# Patient Record
Sex: MALE | Race: WHITE | Employment: STUDENT | URBAN - METROPOLITAN AREA
[De-identification: names, ages, dates, MRNs, and addresses within clinical notes are randomized per-mention and may not be internally consistent; named-entity substitution may affect disease eponyms.]

---
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
## 2023-12-15 ENCOUNTER — OFFICE VISIT (OUTPATIENT)
Dept: PRIMARY CARE | Facility: CLINIC | Age: 23
End: 2023-12-15
Payer: COMMERCIAL

## 2023-12-15 VITALS
HEART RATE: 103 BPM | BODY MASS INDEX: 28.01 KG/M2 | SYSTOLIC BLOOD PRESSURE: 120 MMHG | HEIGHT: 76 IN | WEIGHT: 230 LBS | DIASTOLIC BLOOD PRESSURE: 70 MMHG | RESPIRATION RATE: 16 BRPM | TEMPERATURE: 98.7 F | OXYGEN SATURATION: 95 %

## 2023-12-15 DIAGNOSIS — J02.9 SORE THROAT: ICD-10-CM

## 2023-12-15 DIAGNOSIS — J03.90 ACUTE TONSILLITIS, UNSPECIFIED ETIOLOGY: ICD-10-CM

## 2023-12-15 DIAGNOSIS — J02.9 ACUTE PHARYNGITIS, UNSPECIFIED ETIOLOGY: ICD-10-CM

## 2023-12-15 LAB — POC RAPID STREP: NEGATIVE

## 2023-12-15 PROCEDURE — 87880 STREP A ASSAY W/OPTIC: CPT | Performed by: FAMILY MEDICINE

## 2023-12-15 PROCEDURE — 1036F TOBACCO NON-USER: CPT | Performed by: FAMILY MEDICINE

## 2023-12-15 PROCEDURE — 99214 OFFICE O/P EST MOD 30 MIN: CPT | Performed by: FAMILY MEDICINE

## 2023-12-15 RX ORDER — METHYLPREDNISOLONE 4 MG/1
TABLET ORAL
Qty: 21 TABLET | Refills: 0 | Status: SHIPPED | OUTPATIENT
Start: 2023-12-15 | End: 2023-12-22

## 2023-12-15 RX ORDER — AZITHROMYCIN 250 MG/1
TABLET, FILM COATED ORAL
Qty: 6 TABLET | Refills: 0 | Status: SHIPPED | OUTPATIENT
Start: 2023-12-15 | End: 2023-12-19

## 2023-12-15 NOTE — PROGRESS NOTES
"Subjective   Patient ID: Yassine Herrera is a 23 y.o. male who presents for Sore Throat.    HPI    Pt is being seen for sore throat and white patches  Ongoing for 3 day    Other symptoms include drainage, sinus pressure, very congested  Pt states discovered the white patch on right side of throat  Pt is taking Cold and Flu medicine some relief    No other concern or question      Review of systems  ; Patient seen today for exam denies any problems with headaches or vision, denies any shortness of breath chest pain nausea or vomiting, no black stool no blood in the stool no heartburn type symptoms denies any problems with constipation or diarrhea, and no dysuria-type symptoms    The patient's allergies medications were reviewed with them today    The patient's social family and surgical history or also reviewed here today, along with her past medical history.     Objective     Alert and active in  no acute distress  HEENT TMs clear oropharynx negative nares clear pos  drainage noted neck supple//positive exudate right tonsillar crypt more than left  With no adenopathy   Heart regular rate and rhythm without murmur and no carotid bruits  Lungs- clear to auscultation bilaterally, no wheeze or rhonchi noted  Thyroid -negative masses or nodularity  Abdomen- soft times four quadrants , bowel sounds positive no masses or organomegaly, negative tenderness guarding or rebound  Neurological exam unremarkable- DTRs in upper and lower extremities within normal limits.   skin -no lesions noted      /70 (BP Location: Right arm, Patient Position: Sitting, BP Cuff Size: Large adult)   Pulse 103   Temp 37.1 °C (98.7 °F) (Temporal)   Resp 16   Ht 1.918 m (6' 3.5\")   Wt 104 kg (230 lb)   SpO2 95%   BMI 28.37 kg/m²     No Known Allergies    Assessment/Plan   Problem List Items Addressed This Visit    None  Visit Diagnoses       Sore throat        Relevant Orders    POCT Rapid Strep A manually resulted (Completed)    Acute " pharyngitis, unspecified etiology        Relevant Medications    azithromycin (Zithromax) 250 mg tablet    methylPREDNISolone (Medrol Dospak) 4 mg tablets    Other Relevant Orders    Adis-Barr Virus Antibody Panel    Acute tonsillitis, unspecified etiology        Relevant Medications    azithromycin (Zithromax) 250 mg tablet    methylPREDNISolone (Medrol Dospak) 4 mg tablets    Other Relevant Orders    Adis-Barr Virus Antibody Panel          Rapid strep performed, NEG.    Z-lópez and Medrol dose pack prescribed.    Labs have been ordered, she/he will have these performed and we will contact her/him with results.  (Adis Barr Antibodies)  Have these performed in a couple of days.    If anything worsens or changes please call us at once, follow up in the office as planned.    Scribe Attestation  By signing my name below, I, Shiela Bernstein MA, Scribe   attest that this documentation has been prepared under the direction and in the presence of Johann Moody DO.       This was an emergent procedure. Benefits, risks, and possible complications of procedure explained to patient/caregiver who verbalized understanding and gave verbal consent.

## 2024-05-28 ENCOUNTER — OFFICE VISIT (OUTPATIENT)
Dept: PRIMARY CARE | Facility: CLINIC | Age: 24
End: 2024-05-28
Payer: COMMERCIAL

## 2024-05-28 VITALS
BODY MASS INDEX: 28.76 KG/M2 | RESPIRATION RATE: 16 BRPM | DIASTOLIC BLOOD PRESSURE: 76 MMHG | SYSTOLIC BLOOD PRESSURE: 124 MMHG | HEART RATE: 50 BPM | OXYGEN SATURATION: 97 % | TEMPERATURE: 97.8 F | WEIGHT: 236.2 LBS | HEIGHT: 76 IN

## 2024-05-28 DIAGNOSIS — Z00.00 ROUTINE GENERAL MEDICAL EXAMINATION AT A HEALTH CARE FACILITY: ICD-10-CM

## 2024-05-28 DIAGNOSIS — H57.12 EYE PAIN, LEFT: ICD-10-CM

## 2024-05-28 DIAGNOSIS — H10.33 ACUTE BACTERIAL CONJUNCTIVITIS OF BOTH EYES: Primary | ICD-10-CM

## 2024-05-28 DIAGNOSIS — Z13.220 LIPID SCREENING: ICD-10-CM

## 2024-05-28 PROCEDURE — 1036F TOBACCO NON-USER: CPT | Performed by: FAMILY MEDICINE

## 2024-05-28 PROCEDURE — 99213 OFFICE O/P EST LOW 20 MIN: CPT | Performed by: FAMILY MEDICINE

## 2024-05-28 PROCEDURE — 3008F BODY MASS INDEX DOCD: CPT | Performed by: FAMILY MEDICINE

## 2024-05-28 RX ORDER — TOBRAMYCIN AND DEXAMETHASONE 3; 1 MG/ML; MG/ML
1 SUSPENSION/ DROPS OPHTHALMIC
Qty: 5 ML | Refills: 0 | Status: SHIPPED | OUTPATIENT
Start: 2024-05-28 | End: 2024-06-04

## 2024-05-28 ASSESSMENT — PATIENT HEALTH QUESTIONNAIRE - PHQ9
2. FEELING DOWN, DEPRESSED OR HOPELESS: NOT AT ALL
1. LITTLE INTEREST OR PLEASURE IN DOING THINGS: NOT AT ALL
SUM OF ALL RESPONSES TO PHQ9 QUESTIONS 1 AND 2: 0

## 2024-05-28 NOTE — PROGRESS NOTES
"Subjective   Patient ID: Yassine Herrera is a 24 y.o. male who presents for Eye Pain.  HPI  pt is being seen for left  Eye pain   ongoing for 1 week ago  other symptoms includes dryness, irritation , redness in both eye   Has tried benadryl, and Vaseline minimal relief       No other concern /question   Review of systems  ; Patient seen today for exam denies any problems with headaches or vision, denies any shortness of breath chest pain nausea or vomiting, no black stool no blood in the stool no heartburn type symptoms denies any problems with constipation or diarrhea, and no dysuria-type symptoms    The patient's allergies medications were reviewed with them today    The patient's social family and surgical history or also reviewed here today, along with her past medical history.     Objective     Alert and active in  no acute distress  HEENT TMs clear oropharynx negative nares clear no drainage noted neck supple  With no adenopathy//bilateral eyes show some exudate erythema on the eyelids consistent with probable irritation from allergies vase contact irritation no signs of photophobia   Heart regular rate and rhythm without murmur and no carotid bruits  Lungs- clear to auscultation bilaterally, no wheeze or rhonchi noted  Thyroid -negative masses or nodularity  Abdomen- soft times four quadrants , bowel sounds positive no masses or organomegaly, negative tenderness guarding or rebound    skin -no lesions noted      /76 (BP Location: Right arm, Patient Position: Sitting, BP Cuff Size: Large adult)   Pulse 50   Temp 36.6 °C (97.8 °F) (Temporal)   Resp 16   Ht 1.918 m (6' 3.5\")   Wt 107 kg (236 lb 3.2 oz)   SpO2 97%   BMI 29.13 kg/m²     No Known Allergies    Assessment/Plan   Problem List Items Addressed This Visit       BMI 29.0-29.9,adult    Eye pain, left    Routine general medical examination at a health care facility    Relevant Orders    CBC and Auto Differential    Comprehensive Metabolic Panel "     Other Visit Diagnoses       Acute bacterial conjunctivitis of both eyes    -  Primary    Relevant Medications    tobramycin-dexamethasone (Tobradex) ophthalmic suspension    Lipid screening        Relevant Orders    Lipid Panel        Will stop wearing contacts for a week let me know later in the week if things are not better understands portance close follow-up with us      If anything worsens or changes please call us at once, follow up in the office as planned,

## 2024-12-10 NOTE — PROGRESS NOTES
Mr Herrera's mother accompanied him to today's appointment    History of Present Illness:  Yassine Herrera is a 24 y.o. male with a family history of cancer.  Yassine Herrera was self-referred to the Cancer Genetics Clinic at [facility]. Yassine Herrera is interested in genetic testing to clarify their personal risk for cancer, as well as the risks to their family members.    Cancer Medical History:  Personal history of cancer? No     Prior genetic testing? No     Cancer screening history:  Colonoscopy? No  Endoscopy? No   Dermatology? No   Prostate? No  Other cancer screening? None        Family history:  A 4-generation pedigree was obtained and was significant for the following:   Mother (living, 48) with an identified MSH2 c.2038C>T (p.[id number]) mutation per [employer] in 2018. She has a h/o mucinous ovarian cancer (stage III) diagnosed at 42  Maternal grandfather (living, 69) with a h/o colon cancer at 45. His brother has a h/o colon cancer at 48 and is Hdz Syndrome positive.  Paternal uncle (living, 56) with a h/o leukemia at 11  Maternal ancestry is English, Ukrainian, and Romanian. Paternal ancestry is English. There is no known Ashkenazi Mandaen ancestry. Consanguinity was denied.       Discussion:  Yassine Herrera is a 24 y.o. old male with personal/family history of colon cancer.  Based on his family history, Yassine Herrera meets NCCN criteria for testing of the MSH2 gene. Our discussion is summarized below.    We reviewed genes and chromosomes and inherited forms of colon cancer, specifically MSH2. We discussed that most cancers are not due to an inherited genetic susceptibility. However, in about 5-10% of families, there is an inherited genetic mutation that can make a person more susceptible to developing certain forms of cancer. Within these families, we often see multiple family members with cancer, occurring in multiple generations. In addition, earlier onset cancers are suggestive of an inherited  form of cancer. Finally, there is a clustering of certain types of cancer in these families.    We discussed Hdz syndrome which is caused by germline mutations in one of five genes - MLH1, MSH2, MSH6, PMS2, and EPCAM. Individuals with Hdz syndrome have increased risk to develop colon cancer over their lifetime, and an increased risk for a second colon primary. Women with Hdz syndrome have an increased risk for endometrial cancer and ovarian cancer. Other cancers associated with Hdz syndrome include gastric cancer, hepatobiliary, small bowel, urinary tract, and rarely pancreatic cancer. Understanding if an individual has this condition results in changes to their medical management such as more frequent colonoscopies. For MSH2 specifically, individuals have the following risks and recommendations:    GI Cancers  -Individuals with MSH2 mutations have a 33-52% chance to develop colon cancer by age 80. This is elevated over the general population risk of 4-6%. NCCN states that for MSH2 mutation carriers who have had an adenocarcinoma, a segmental or extended colectomy is indicated.     -There is also an increased risk for individuals with MSH2 mutations to develop stomach cancer (0.2-9.0%) and small bowel cancer (1-10%). There is no clear data currently that supports screenings for these cancers, but cancer screening can be considered based on family history, other risk factors, etc. If screening is pursued, upper endoscopy exams should begin at age 40 and be repeated every 3-5 years.     Other Cancer Risks  -Men with MSH2 mutations have an increased risk to develop prostate cancer (4-16%), and should undergo annual PSA screenings. There is not sufficient evidence to suggest starting PSA screenings at an earlier age than normal. Men and women with MSH2 mutations also have an increased risk to develop CNS cancers, so an annual physical and neurological exam beginning between ages 25-30 can be  considered.    -Individuals with MSH2 mutations have a slight increased risk to develop pancreatic cancer but no additional surveillance is recommended at this time, in the absence of a family history of these cancers. Mr. Herrera does not have any known family history of pancreatic cancer.     -Individuals with MSH2 mutations, especially men, can consider having a urinalysis every year to screen for other cancers.     Hdz syndrome genes, such as MSH2, are inherited in an autosomal dominant fashion. This means that if an individual has a change in one of these genes, their siblings and children have a 50% chance of also having that gene change and a 50% chance of not having the gene change. As such, Mr. Herrera has a 50% chance to have the same mutation as his mother.    We also briefly discussed that MSH2 is associated with a rare, separate condition when an individual inherits a mutation in BOTH of their MSH2 copies (autosomal recessive inheritance). This condition is caused constitutional mismatch repair deficiency (CMRRD) syndrome. Characteristics of CMRRD include risks for cancer at a young age (such as blood, brain, or GI cancers) as well as distinct skin findings (like flat brown birth marks called cafe au laits or patches of lighter skin). These symptoms are typically seen in infancy or young adulthood. There are options for prenatal or preconception counseling for Yassine Herrera's future children (or other family members who would wish to expand their families) should like to explore the risk to have a child with CMRRD further.    We discussed that there are multiple genes associated with increased cancer risk. Some genes are considered highly penetrant cancer genes, meaning a mutation in the gene confers a high risk of cancer. Additionally, there are other intermediate (moderate risk) cancer genes. For some of the moderate risk genes, there is often limited information regarding the degree to which a  mutation in the gene affects risk of different types of cancers. Additionally, for some of these moderate risk genes, the appropriate management for individuals who have a mutation in one of these genes is not always clear. Our knowledge about the cancer risks associated with mutations in these moderate risk genes is always growing, and we will likely be able to provide more comprehensive information in the future.    If Mr. Herrera elects to do a multi-gene panel, we reviewed the three results we can get back:  1. Positive- Identified a change in a cancer gene that confers an increased cancer risk. We will discuss potential changes in management for him and his family based on the specific gene mutation found.  2. Negative- Clears him for a majority of predisposition cancer syndromes that we are aware of, but cannot clear her for any/all cancer predisposition risks. She would continue to be screened based on her personal and family history.  3. Variant of Uncertain Significance (VUS)- We discussed should an uncertain result come back that this would be treated like a negative result (i.e. no management recommendation will be made no familial variant testing) as the implications of this finding are currently unknown.    Lastly, we discussed the Genetic Information Non-discrimination Act (NIKKO) of 2008. We discussed that per this federal law, employers (at companies with 15 employees or greater) and health insurance companies (barring  and other  insurances) are forbidden to ask for and use genetic information against another person. As such, health insurance companies cannot ask for genetic information and use findings affect coverage or rates. However, luxury insurances such as life insurance, long term care insurance, and/or private disability insurance companies are not forbidden against using genetic information when an individual takes out a new/additional policy in one of those areas. As such,  for unaffected individuals it could be beneficial to explore/take out policies in luxury insurance areas PRIOR to undergoing genetic testing.    Yassine Herrera was counseled about hereditary cancer susceptibility including cancer risks, options for increased screening and/or risk reduction, genetic testing, and the implications for other family members. Yassine Herrera elected to move forward with genetic testing via a single site testing for only the familial mutation with was ordered through [facility].      Results are typically available within 2-3 weeks, and Yassine Herrera will return to the Cancer Genetics Clinic to discuss his testing results. At that time, we will make recommendations for both Yassine Herrera and his family members in terms of cancer screening and/or cancer risk reduction options.         PLAN:  1.  Yassine Herrera elected to undergo genetic testing via single site testing of the familial variant through [facility]. Blood was drawn and sent to [facility].    2. Yassine Herrera will return to the genetics clinic via telephone in approximately 2-3 weeks to discuss his test results.    3. We remain available to Yassine Herrera or his family members at [phone] if any questions arise regarding information discussed at today's visit.    Luna Garcia MS, [facility] [place], [place]  Certified Genetic Counselor  [facility]  [phone]    Time spent: 40 minutes

## 2024-12-11 ENCOUNTER — TELEPHONE (OUTPATIENT)
Dept: GENETICS | Facility: CLINIC | Age: 24
End: 2024-12-11
Payer: COMMERCIAL

## 2024-12-11 NOTE — TELEPHONE ENCOUNTER
Patient's mother, Julieta Herrera, called regarding her son's upcoming appointment with [facility]. Patient's mother confirmed that she had genetic testing and gave verbal consent for the genetic counselor to look at her  chart and to share the results of her genetic testing with the patient.

## 2024-12-11 NOTE — TELEPHONE ENCOUNTER
Called patient regarding his upcoming appointment with RALEIGH Yung. Left a message and requested that he call me back at my direct line.

## 2024-12-20 ENCOUNTER — LAB (OUTPATIENT)
Dept: LAB | Facility: CLINIC | Age: 24
End: 2024-12-20
Payer: COMMERCIAL

## 2024-12-20 ENCOUNTER — CLINICAL SUPPORT (OUTPATIENT)
Dept: GENETICS | Facility: CLINIC | Age: 24
End: 2024-12-20
Payer: COMMERCIAL

## 2024-12-20 VITALS
SYSTOLIC BLOOD PRESSURE: 151 MMHG | RESPIRATION RATE: 16 BRPM | WEIGHT: 234.35 LBS | BODY MASS INDEX: 28.54 KG/M2 | HEART RATE: 65 BPM | HEIGHT: 76 IN | OXYGEN SATURATION: 97 % | TEMPERATURE: 98.2 F | DIASTOLIC BLOOD PRESSURE: 79 MMHG

## 2024-12-20 DIAGNOSIS — Z71.83 ENCOUNTER FOR NONPROCREATIVE GENETIC COUNSELING: ICD-10-CM

## 2024-12-20 DIAGNOSIS — Z84.81 FAMILY HISTORY OF GENETIC DISEASE CARRIER: ICD-10-CM

## 2024-12-20 PROCEDURE — 96040 PR MEDICAL GENETICS COUNSELING EACH 30 MINUTES: CPT

## 2024-12-20 PROCEDURE — 96040 HC GENETIC COUNSELING, EACH 30 MIN: CPT

## 2024-12-20 PROCEDURE — 36415 COLL VENOUS BLD VENIPUNCTURE: CPT

## 2024-12-20 ASSESSMENT — PAIN SCALES - GENERAL: PAINLEVEL_OUTOF10: 0-NO PAIN

## 2025-01-10 ENCOUNTER — TELEPHONE (OUTPATIENT)
Dept: GENETICS | Facility: CLINIC | Age: 25
End: 2025-01-10
Payer: COMMERCIAL

## 2025-01-13 LAB
COMMENTS - MP RESULT TYPE: NORMAL
SCAN RESULT: NORMAL

## 2025-01-14 NOTE — PROGRESS NOTES
Mr Herrera was accompanied by his mother at his appointment today.    - You will received a copy of your genetic testing results with a family letter at the time of the appointment.  - Below is a summary of what we discussed at your appointment.    - Mr Herrera is a 24 year old male with a family history of MSH2 mutation and ovarian cancer in his mother.  - Mr Herrera was initially seen in the Cancer Genetics Clinic on 12/20/24 for counseling and coordination of testing.  - Following that appointment, a blood sample was sent for genetic testing via the single site analysis from [facility].  - I called him to discuss his results. Our discussion is summarized below.    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 Mutations in the MSH2 gene are associated with a condition called Hdz syndrome.       GI Cancers  -Individuals with MSH2 mutations have a 33-52% chance to develop colon cancer by age 80. A meta-analysis has reported cumulative risk for CRC for MSH2 carriers through age 70 for males to be 53.9% and for females to be 38.6% (Augusto BALLESTEROS, et al. JNCI Cancer Spectr 2020; 4:ctck221). This is elevated over the general population risk of 4%. NCCN states that for MSH2 mutation carriers who have had an adenocarcinoma, a segmental or extended colectomy is indicated. Currently, high-quality colonoscopy at age 20-25 years or 2-5 years prior to the earliest CRC if it is diagnosed before age 25 years and repeating every 1-2 years is recommended. Additionally, individuals can consider using daily aspirin to reduce their future risk of CRC (In a large, prospective, placebo-controlled, multinational CAPP2 study of individuals with MLH1-, MSH2-, and MSH6-associated LS, daily aspirin 600 mg/day for at least 2 years was found to significantly decrease the likelihood of incident CRC (per-protocol HR, 0.56; 95% CI, 0.34-0.91;  intention-to-treat HR, 0.65; 95% CI, 0.43-0.97) with no significant increased likelihood of adverse events (Justus J, et al. Lancet 2020;395:5790-8041). The decision to use aspirin for reduction of CRC risk in LS and the dose chosen should be made on an individual basis, including discussion of individual risks, benefits, adverse effects, and childbearing plans.    -There is also an increased risk for individuals with MSH2 mutations to develop stomach cancer (0.2-9.0%) and small bowel cancer (1-10%). Upper GI surveillance with EGD starting at age 30-40 years and repeat every 2-4 years, preferably performed in conjunction with colonoscopy is currentlyu recommended (Wyatt S, et al. Int J Cancer 2021;148:106-114; Shila FU et al. Gastrointest Endosc 2022;95:105-114; Con S, et al. Can Prev Res [Phila] 2020; 13:8014-8633). A referral to gastroenterology was offered, however Mr Herrera would like to establish with his mother provider at [facility] if possible.    Other Cancer Risks  -Men with MSH2 mutations have an increased risk to develop prostate cancer (4-24%), and should undergo annual PSA screenings beginning at the age of 40 (and to consider screening at annual intervals rather than every other year). There is not sufficient evidence to suggest starting PSA screenings at an earlier age than normal. Men and women with MSH2 mutations also have an increased risk to develop CNS cancers. Patients should be educated regarding signs and symptoms of neurologic cancer and the importance of prompt reporting of abnormal symptoms to their physicians.    -Individuals with MSH2 mutations have a slight increased risk to develop pancreatic cancer but no additional surveillance is recommended at this time, in the absence of a family history of these cancers. Mr Herrera does not have any known family history of pancreatic cancer.     -Individuals with MSH2 mutations, especially men, can consider having a urinalysis every  "year beginning at age 30-35 to screen for other cancers, such as bladder cancer (lifetime risk of 4-13% compared to the general population risk of 2%) and renal pelvis/ureter cancers (lifetime risk of 2-28%).     Implications for Family Members  - We discussed that each of Yassine Herrera's future children would have a 50% chance to inherit the same MSH2 mutation. In addition, each of his siblings would have a 50% chance to have inherited this mutation.      - We also briefly discussed that MSH2 is associated with a rare, separate condition when an individual inherits a mutation in BOTH of their MSH2 copies (autosomal recessive inheritance). This condition is caused constitutional mismatch repair deficiency (CMRRD) syndrome. Characteristics of CMRRD include risks for cancer at a young age (such as blood, brain, or GI cancers) as well as distinct skin findings (like flat brown birth marks called cafe au laits or patches of lighter skin). These symptoms are typically seen in infancy or young adulthood. There are options for prenatal or preconception counseling for Yassine Bobs children (or other family members who would wish to expand their families) should like to explore the risk to have a child with CMRRD further.    -Any relatives who need assistance finding a genetic counselor in their area can search online for \"Find a Genetic Counselor\" or visit the National Society of Genetic Counselors' page at <https://www.nsgc.org/findageneticcounselor> to find a provider. They are also more than welcome to contact me directly at [phone].      Luna Garcia MS, [facility] [place]  Certified Genetic Counselor  [facility]  [phone]    Total time spent on day of encounter: 30 minutes (20 minutes with patient, 10 minutes on pre/post patient care activities, including documentation).        "

## 2025-01-24 ENCOUNTER — CLINICAL SUPPORT (OUTPATIENT)
Dept: GENETICS | Facility: CLINIC | Age: 25
End: 2025-01-24
Payer: COMMERCIAL

## 2025-01-24 VITALS
OXYGEN SATURATION: 96 % | WEIGHT: 230.16 LBS | BODY MASS INDEX: 28.32 KG/M2 | TEMPERATURE: 99.3 F | SYSTOLIC BLOOD PRESSURE: 133 MMHG | RESPIRATION RATE: 16 BRPM | HEART RATE: 53 BPM | DIASTOLIC BLOOD PRESSURE: 70 MMHG

## 2025-01-24 DIAGNOSIS — Z15.09 MSH2 GENE MUTATION POSITIVE: ICD-10-CM

## 2025-01-24 DIAGNOSIS — Z15.01 MSH2 GENE MUTATION POSITIVE: ICD-10-CM

## 2025-01-24 DIAGNOSIS — Z71.83 ENCOUNTER FOR NONPROCREATIVE GENETIC COUNSELING: ICD-10-CM

## 2025-01-24 PROCEDURE — 96041 GENETIC COUNSELING SVC EA 30: CPT

## 2025-01-24 ASSESSMENT — PAIN SCALES - GENERAL: PAINLEVEL_OUTOF10: 0-NO PAIN

## 2025-05-07 ENCOUNTER — TELEPHONE (OUTPATIENT)
Dept: PRIMARY CARE | Facility: CLINIC | Age: 25
End: 2025-05-07
Payer: COMMERCIAL

## 2025-05-07 DIAGNOSIS — K52.9 ACUTE GASTROENTERITIS: Primary | ICD-10-CM

## 2025-06-05 ENCOUNTER — PREP FOR PROCEDURE (OUTPATIENT)
Age: 25
End: 2025-06-05

## 2025-06-05 DIAGNOSIS — Z12.11 ENCOUNTER FOR SCREENING COLONOSCOPY: ICD-10-CM

## 2025-06-09 ENCOUNTER — PREP FOR PROCEDURE (OUTPATIENT)
Dept: SURGERY | Age: 25
End: 2025-06-09

## 2025-06-09 DIAGNOSIS — Z80.0 FAMILY HISTORY OF COLON CANCER: ICD-10-CM

## 2025-06-09 RX ORDER — SODIUM CHLORIDE 9 MG/ML
INJECTION, SOLUTION INTRAVENOUS PRN
Status: CANCELLED | OUTPATIENT
Start: 2025-06-09

## 2025-06-09 RX ORDER — SODIUM CHLORIDE 0.9 % (FLUSH) 0.9 %
5-40 SYRINGE (ML) INJECTION EVERY 12 HOURS SCHEDULED
Status: CANCELLED | OUTPATIENT
Start: 2025-06-09

## 2025-06-09 RX ORDER — SODIUM CHLORIDE 0.9 % (FLUSH) 0.9 %
5-40 SYRINGE (ML) INJECTION PRN
Status: CANCELLED | OUTPATIENT
Start: 2025-06-09

## 2025-06-12 ENCOUNTER — TELEPHONE (OUTPATIENT)
Dept: PRIMARY CARE | Facility: CLINIC | Age: 25
End: 2025-06-12

## 2025-06-12 ENCOUNTER — OFFICE VISIT (OUTPATIENT)
Dept: PRIMARY CARE | Facility: CLINIC | Age: 25
End: 2025-06-12
Payer: COMMERCIAL

## 2025-06-12 VITALS
OXYGEN SATURATION: 97 % | RESPIRATION RATE: 16 BRPM | DIASTOLIC BLOOD PRESSURE: 70 MMHG | WEIGHT: 229.6 LBS | SYSTOLIC BLOOD PRESSURE: 130 MMHG | HEIGHT: 77 IN | BODY MASS INDEX: 27.11 KG/M2 | TEMPERATURE: 98.4 F | HEART RATE: 51 BPM

## 2025-06-12 DIAGNOSIS — J03.90 ACUTE TONSILLITIS, UNSPECIFIED ETIOLOGY: ICD-10-CM

## 2025-06-12 DIAGNOSIS — J01.00 ACUTE MAXILLARY SINUSITIS, RECURRENCE NOT SPECIFIED: Primary | ICD-10-CM

## 2025-06-12 PROCEDURE — 1036F TOBACCO NON-USER: CPT | Performed by: FAMILY MEDICINE

## 2025-06-12 PROCEDURE — 99213 OFFICE O/P EST LOW 20 MIN: CPT | Performed by: FAMILY MEDICINE

## 2025-06-12 PROCEDURE — 3008F BODY MASS INDEX DOCD: CPT | Performed by: FAMILY MEDICINE

## 2025-06-12 RX ORDER — CEFDINIR 300 MG/1
600 CAPSULE ORAL DAILY
Qty: 14 CAPSULE | Refills: 0 | Status: SHIPPED | OUTPATIENT
Start: 2025-06-12 | End: 2025-06-19

## 2025-06-12 ASSESSMENT — ENCOUNTER SYMPTOMS: DEPRESSION: 0

## 2025-06-12 NOTE — TELEPHONE ENCOUNTER
Pt mom called in stating pt has been dealing with what they thought were allergies for over a week now but has progressively gotten worse. Mom wants to know if we can do a virtual appointment for him, he doesn't want to take off work but is currently working from home today and tomorrow so would be able to do virtual      Please advise  Call mom [phone]

## 2025-06-12 NOTE — TELEPHONE ENCOUNTER
Johann Moody, DO to Me  Do [id number] [employer]  (Selected Message)        6/12/25  9:38 AM  It is best if I can look at his ears, lets make an appointment for him when he takes lunch say at 1130 or 1145 can be in and out of here and 1/2-hour, if that is not possible then we could do a virtual but still better I will look at those ears, you can squeeze him in whenever they would like thanks

## 2025-06-12 NOTE — PROGRESS NOTES
"Subjective   Patient ID: Yassine Herrera is a 25 y.o. male who presents for Allergies.  HPI    Patient presents in office for allergies. Patient states that it has gotten worse over the past week. Symptoms runny nose, blowing out less green and more clear discharge, sneezing, and dry and itchy eyes. He denies significant coughing. Has tried Claritin. No relief. No one is sick at home.      Review of systems  ; Patient seen today for exam denies any problems with headaches or vision, denies any shortness of breath chest pain nausea or vomiting, no black stool no blood in the stool no heartburn type symptoms denies any problems with constipation or diarrhea, and no dysuria-type symptoms    The patient's allergies medications were reviewed with them today    The patient's social family and surgical history or also reviewed here today, along with her past medical history.     Objective     Physical examination;  vital signs are as noted, alert and oriented in no acute distress  HEENT exam TMs are showing fluid bilaterally, pharynx reveals postnasal drainage noted, positive pain over maxillary and frontal sinuses, positive shotty adenopathy noted bilateral neck, neck exam was supple without masses heart regular rate and rhythm without gallops lungs clear to auscultation with no wheezing or rhonchi bilaterally , extremities no edema skin without any changes      /70 (BP Location: Right arm, Patient Position: Sitting, BP Cuff Size: Adult)   Pulse 51   Temp 36.9 °C (98.4 °F) (Temporal)   Resp 16   Ht 1.956 m (6' 5\")   Wt 104 kg (229 lb 9.6 oz)   SpO2 97%   BMI 27.23 kg/m²         Assessment/Plan   Problem List Items Addressed This Visit    None  Visit Diagnoses         Acute maxillary sinusitis, recurrence not specified    -  Primary      Acute tonsillitis, unspecified etiology        Relevant Medications    cefdinir (Omnicef) 300 mg capsule            Prescribed Cefdinir 600 mg every day for 10 days. Take yogurt " with it.    Recommended using OTC Afrin nasal spray for 2-3 days for de-congestion.    Take OTC Sudafed Sinus.    Drink plenty of fluid, water, Gatorade, warm tea with honey.    If anything worsens or changes please call us at once, follow up in the office as planned,       Scribe Attestation  By signing my name below, INoemi Scribe   attest that this documentation has been prepared under the direction and in the presence of Johann Moody DO.    All medical record entries made by the Scribe were at my direction and personally dictated by me.   I have reviewed the chart and agree that the record accurately reflects my personal performance of the history, physical exam, discussion, and plan.

## 2025-07-03 ENCOUNTER — ANESTHESIA EVENT (OUTPATIENT)
Dept: OPERATING ROOM | Age: 25
End: 2025-07-03
Payer: COMMERCIAL

## 2025-07-03 ENCOUNTER — HOSPITAL ENCOUNTER (OUTPATIENT)
Age: 25
Setting detail: OUTPATIENT SURGERY
Discharge: HOME | End: 2025-07-03
Attending: COLON & RECTAL SURGERY | Admitting: COLON & RECTAL SURGERY
Payer: COMMERCIAL

## 2025-07-03 ENCOUNTER — ANESTHESIA (OUTPATIENT)
Dept: OPERATING ROOM | Age: 25
End: 2025-07-03
Payer: COMMERCIAL

## 2025-07-03 VITALS
RESPIRATION RATE: 16 BRPM | TEMPERATURE: 98 F | HEIGHT: 77 IN | DIASTOLIC BLOOD PRESSURE: 77 MMHG | SYSTOLIC BLOOD PRESSURE: 129 MMHG | BODY MASS INDEX: 27.16 KG/M2 | OXYGEN SATURATION: 99 % | WEIGHT: 230 LBS | HEART RATE: 78 BPM

## 2025-07-03 PROBLEM — Z14.8 CARRIER OF GENE FOR LYNCH SYNDROME: Status: ACTIVE | Noted: 2025-07-03

## 2025-07-03 PROCEDURE — 45378 DIAGNOSTIC COLONOSCOPY: CPT | Performed by: COLON & RECTAL SURGERY

## 2025-07-03 PROCEDURE — 2580000003 HC RX 258: Performed by: COLON & RECTAL SURGERY

## 2025-07-03 PROCEDURE — 3600000003 HC SURGERY LEVEL 3 BASE: Performed by: COLON & RECTAL SURGERY

## 2025-07-03 PROCEDURE — 2500000003 HC RX 250 WO HCPCS: Performed by: COLON & RECTAL SURGERY

## 2025-07-03 PROCEDURE — 7100000000 HC PACU RECOVERY - FIRST 15 MIN: Performed by: COLON & RECTAL SURGERY

## 2025-07-03 PROCEDURE — 2709999900 HC NON-CHARGEABLE SUPPLY: Performed by: COLON & RECTAL SURGERY

## 2025-07-03 PROCEDURE — 3700000001 HC ADD 15 MINUTES (ANESTHESIA): Performed by: COLON & RECTAL SURGERY

## 2025-07-03 PROCEDURE — 3700000000 HC ANESTHESIA ATTENDED CARE: Performed by: COLON & RECTAL SURGERY

## 2025-07-03 PROCEDURE — 43235 EGD DIAGNOSTIC BRUSH WASH: CPT | Performed by: COLON & RECTAL SURGERY

## 2025-07-03 PROCEDURE — 6360000002 HC RX W HCPCS

## 2025-07-03 PROCEDURE — 7100000011 HC PHASE II RECOVERY - ADDTL 15 MIN: Performed by: COLON & RECTAL SURGERY

## 2025-07-03 PROCEDURE — 7100000010 HC PHASE II RECOVERY - FIRST 15 MIN: Performed by: COLON & RECTAL SURGERY

## 2025-07-03 PROCEDURE — 3600000013 HC SURGERY LEVEL 3 ADDTL 15MIN: Performed by: COLON & RECTAL SURGERY

## 2025-07-03 PROCEDURE — 7100000001 HC PACU RECOVERY - ADDTL 15 MIN: Performed by: COLON & RECTAL SURGERY

## 2025-07-03 RX ORDER — SODIUM CHLORIDE 0.9 % (FLUSH) 0.9 %
5-40 SYRINGE (ML) INJECTION PRN
Status: DISCONTINUED | OUTPATIENT
Start: 2025-07-03 | End: 2025-07-03 | Stop reason: HOSPADM

## 2025-07-03 RX ORDER — SODIUM CHLORIDE 9 MG/ML
INJECTION, SOLUTION INTRAVENOUS PRN
Status: DISCONTINUED | OUTPATIENT
Start: 2025-07-03 | End: 2025-07-03 | Stop reason: HOSPADM

## 2025-07-03 RX ORDER — PROPOFOL 10 MG/ML
INJECTION, EMULSION INTRAVENOUS
Status: DISCONTINUED | OUTPATIENT
Start: 2025-07-03 | End: 2025-07-03 | Stop reason: SDUPTHER

## 2025-07-03 RX ORDER — FENTANYL CITRATE 0.05 MG/ML
50 INJECTION, SOLUTION INTRAMUSCULAR; INTRAVENOUS EVERY 5 MIN PRN
Status: DISCONTINUED | OUTPATIENT
Start: 2025-07-03 | End: 2025-07-03 | Stop reason: HOSPADM

## 2025-07-03 RX ORDER — PROCHLORPERAZINE EDISYLATE 5 MG/ML
5 INJECTION INTRAMUSCULAR; INTRAVENOUS
Status: DISCONTINUED | OUTPATIENT
Start: 2025-07-03 | End: 2025-07-03 | Stop reason: HOSPADM

## 2025-07-03 RX ORDER — SODIUM CHLORIDE 0.9 % (FLUSH) 0.9 %
5-40 SYRINGE (ML) INJECTION EVERY 12 HOURS SCHEDULED
Status: DISCONTINUED | OUTPATIENT
Start: 2025-07-03 | End: 2025-07-03 | Stop reason: HOSPADM

## 2025-07-03 RX ORDER — DIPHENHYDRAMINE HYDROCHLORIDE 50 MG/ML
12.5 INJECTION, SOLUTION INTRAMUSCULAR; INTRAVENOUS
Status: DISCONTINUED | OUTPATIENT
Start: 2025-07-03 | End: 2025-07-03 | Stop reason: HOSPADM

## 2025-07-03 RX ORDER — HYDRALAZINE HYDROCHLORIDE 20 MG/ML
10 INJECTION INTRAMUSCULAR; INTRAVENOUS
Status: DISCONTINUED | OUTPATIENT
Start: 2025-07-03 | End: 2025-07-03 | Stop reason: HOSPADM

## 2025-07-03 RX ORDER — LABETALOL HYDROCHLORIDE 5 MG/ML
10 INJECTION, SOLUTION INTRAVENOUS
Status: DISCONTINUED | OUTPATIENT
Start: 2025-07-03 | End: 2025-07-03 | Stop reason: HOSPADM

## 2025-07-03 RX ORDER — OXYCODONE HYDROCHLORIDE 5 MG/1
10 TABLET ORAL PRN
Status: DISCONTINUED | OUTPATIENT
Start: 2025-07-03 | End: 2025-07-03 | Stop reason: HOSPADM

## 2025-07-03 RX ORDER — LIDOCAINE HYDROCHLORIDE 10 MG/ML
INJECTION, SOLUTION EPIDURAL; INFILTRATION; INTRACAUDAL; PERINEURAL
Status: DISCONTINUED | OUTPATIENT
Start: 2025-07-03 | End: 2025-07-03 | Stop reason: SDUPTHER

## 2025-07-03 RX ORDER — MEPERIDINE HYDROCHLORIDE 25 MG/ML
12.5 INJECTION INTRAMUSCULAR; INTRAVENOUS; SUBCUTANEOUS EVERY 5 MIN PRN
Status: DISCONTINUED | OUTPATIENT
Start: 2025-07-03 | End: 2025-07-03 | Stop reason: HOSPADM

## 2025-07-03 RX ORDER — OXYCODONE HYDROCHLORIDE 5 MG/1
5 TABLET ORAL PRN
Status: DISCONTINUED | OUTPATIENT
Start: 2025-07-03 | End: 2025-07-03 | Stop reason: HOSPADM

## 2025-07-03 RX ORDER — SODIUM CHLORIDE, SODIUM LACTATE, POTASSIUM CHLORIDE, CALCIUM CHLORIDE 600; 310; 30; 20 MG/100ML; MG/100ML; MG/100ML; MG/100ML
INJECTION, SOLUTION INTRAVENOUS CONTINUOUS
Status: DISCONTINUED | OUTPATIENT
Start: 2025-07-03 | End: 2025-07-03 | Stop reason: HOSPADM

## 2025-07-03 RX ORDER — FENTANYL CITRATE 0.05 MG/ML
25 INJECTION, SOLUTION INTRAMUSCULAR; INTRAVENOUS EVERY 5 MIN PRN
Status: DISCONTINUED | OUTPATIENT
Start: 2025-07-03 | End: 2025-07-03 | Stop reason: HOSPADM

## 2025-07-03 RX ORDER — ONDANSETRON 2 MG/ML
4 INJECTION INTRAMUSCULAR; INTRAVENOUS
Status: DISCONTINUED | OUTPATIENT
Start: 2025-07-03 | End: 2025-07-03 | Stop reason: HOSPADM

## 2025-07-03 RX ADMIN — PROPOFOL 50 MG: 10 INJECTION, EMULSION INTRAVENOUS at 08:39

## 2025-07-03 RX ADMIN — LIDOCAINE HYDROCHLORIDE 25 MG: 10 INJECTION, SOLUTION EPIDURAL; INFILTRATION; INTRACAUDAL; PERINEURAL at 08:31

## 2025-07-03 RX ADMIN — PROPOFOL 50 MG: 10 INJECTION, EMULSION INTRAVENOUS at 08:36

## 2025-07-03 RX ADMIN — PROPOFOL 30 MG: 10 INJECTION, EMULSION INTRAVENOUS at 08:46

## 2025-07-03 RX ADMIN — PROPOFOL 50 MG: 10 INJECTION, EMULSION INTRAVENOUS at 08:45

## 2025-07-03 RX ADMIN — SODIUM CHLORIDE: 0.9 INJECTION, SOLUTION INTRAVENOUS at 07:48

## 2025-07-03 RX ADMIN — PROPOFOL 250 MG: 10 INJECTION, EMULSION INTRAVENOUS at 08:31

## 2025-07-03 RX ADMIN — PROPOFOL 50 MG: 10 INJECTION, EMULSION INTRAVENOUS at 08:33

## 2025-07-03 RX ADMIN — PROPOFOL 50 MG: 10 INJECTION, EMULSION INTRAVENOUS at 08:41

## 2025-07-03 ASSESSMENT — PAIN SCALES - GENERAL: PAINLEVEL_OUTOF10: 0

## 2025-07-03 NOTE — DISCHARGE INSTRUCTIONS
Normal post colonoscopy instructions    No driving today  [facility]  Outpatient Discharge Instructions    To continue your care at home, please follow the instructions below and any additional discharge instructions given to you by your physician.    GENERAL ANESTHESIA:  Do not drive or operate machinery for 24hrs after discharge,  Do not drink alcohol, take tranquilizers, sleeping medication, or any other medication not directly instructed by your physician,   Do not make any important decisions or sign any legal documents for 24hrs after surgery,  Have someone with you for 24hrs after surgery to assist you as needed.    ACTIVITY:  Light activity for 24hrs,  No heavy lifting or exercise until instructed by your physician,  You may resume normal activities once instructed by your physician,  Special Instruction: ___________________________________________________________________    FLUIDS AND DIET:  An upset stomach or feeling sick (nausea) can commonly occur after surgery and/or pain medication use. To help minimize nausea:  Do not eat a heavy meal soon after your surgery,    Start with water or other clear liquids,  Advance to mild or bland items like Jell-O, dry toast, crackers, etc.,  Avoid caffeine,  Do not drink alcohol for at least 24 hours after surgery,  Your physician may prescribe anti-nausea medication if your nausea continues,  If you are free from nausea for 24hrs, you can advance to your normal diet as tolerated.    OPERATIVE SITE:  A small amount of bleeding or drainage after surgery is normal. Your physician will provide you with specific instructions on how to care for your surgical site and/or dressing.   Try not to touch your surgical site unless necessary,   Always wash your hands BEFORE and AFTER changing your dressing if instructed by your physician,   Proper handwashing includes wetting your hands with clean water, applying soap, lathering your hands by rubbing them together with

## 2025-07-03 NOTE — ANESTHESIA PRE PROCEDURE
Department of Anesthesiology  Preprocedure Note       Name:  Jimy Germain   Age:  25 y.o.  :  2000                                          MRN:  [id number]         Date:  7/3/2025      Surgeon: Surgeon(s):  Floyd Tran MD    Procedure: Procedure(s):  Colonoscopy possible biopsy  Esophagogastroduodenoscopy with possible biopsy    Medications prior to admission:   Prior to Admission medications    Medication Sig Start Date End Date Taking? Authorizing Provider   Adapalene-Benzoyl Peroxide 0.1-2.5 % GEL APPLY TO AFFECTED AREA EVERY DAY AT BEDTIME.   Patient not taking: Reported on 2025 10/26/18   Sal Gudino DO       Current medications:    Current Facility-Administered Medications   Medication Dose Route Frequency Provider Last Rate Last Admin    sodium chloride flush 0.9 % injection 5-40 mL  5-40 mL IntraVENous 2 times per day Floyd Tran MD        sodium chloride flush 0.9 % injection 5-40 mL  5-40 mL IntraVENous PRN Floyd Tran MD        0.9 % sodium chloride infusion   IntraVENous PRN Floyd Tran MD           Allergies:  No Known Allergies    Problem List:    Patient Active Problem List   Diagnosis Code    Encounter for screening colonoscopy Z12.11    Family history of colon cancer Z80.0       Past Medical History:  History reviewed. No pertinent past medical history.    Past Surgical History:  History reviewed. No pertinent surgical history.    Social History:    Social History     Tobacco Use    Smoking status: Never    Smokeless tobacco: Never    Tobacco comments:     NON [facility]   Substance Use Topics    Alcohol use: Not on file     Comment: socially                                Counseling given: Not Answered  Tobacco comments: NON [facility]      Vital Signs (Current):   Vitals:    25 1730   Weight: 104.3 kg (230 lb)   Height: 1.956 m (6' 5\")                                              BP Readings from Last 3 Encounters:   18 118/74 (54%, Z =

## 2025-07-03 NOTE — H&P
Department of General Surgery - Adult  Surgical Service general surgery  Attending admit note        CHIEF COMPLAINT: Family history of Pemberton syndrome    History Obtained From:  patient, electronic medical record    HISTORY OF PRESENT ILLNESS:                The patient is a 25 y.o. male who presents with family history of Pemberton syndrome.  Here for initial upper and lower endoscopy for prevention.    Patient otherwise healthy without problems or issues.  Risks and benefits explained.  Consent obtained.    Past Medical History:    History reviewed. No pertinent past medical history.  Past Surgical History:    History reviewed. No pertinent surgical history.  Current Medications:   Current Facility-Administered Medications: sodium chloride flush 0.9 % injection 5-40 mL, 5-40 mL, IntraVENous, 2 times per day  sodium chloride flush 0.9 % injection 5-40 mL, 5-40 mL, IntraVENous, PRN  0.9 % sodium chloride infusion, , IntraVENous, PRN  Allergies:  Patient has no known allergies.    Social History:   TOBACCO:   reports that he has never smoked. He has never used smokeless tobacco.  ETOH:   has no history on file for alcohol use.  DRUGS:   reports no history of drug use.  Family History:   History reviewed. No pertinent family history.    REVIEW OF SYSTEMS:    CONSTITUTIONAL:  negative  EYES:  negative  HEENT:  negative  RESPIRATORY:  negative for  dry cough and dyspnea  CARDIOVASCULAR:  negative for  chest pain, palpitations  GASTROINTESTINAL:  negative for nausea, vomiting, change in bowel habits, abdominal pain, and abdominal distention  GENITOURINARY:  negative  HEMATOLOGIC/LYMPHATIC:  negative  MUSCULOSKELETAL:  negative  NEUROLOGICAL:  negative  BEHAVIOR/PSYCH:  negative    PHYSICAL EXAM:    VITALS:  /71   Pulse 54   Temp 97.9 °F (36.6 °C) (Temporal)   Resp 16   Ht 1.956 m (6' 5\")   Wt 104.3 kg (230 lb)   SpO2 94%   BMI 27.27 kg/m²   CONSTITUTIONAL:  awake, alert, cooperative, no apparent distress, and

## 2025-07-05 PROBLEM — Z12.11 ENCOUNTER FOR SCREENING COLONOSCOPY: Status: RESOLVED | Noted: 2025-06-05 | Resolved: 2025-07-05

## (undated) DEVICE — CONMED SCOPE SAVER BITE BLOCK, 20X27 MM: Brand: SCOPE SAVER

## (undated) DEVICE — GLOVE ORANGE PI 8 1/2   MSG9085

## (undated) DEVICE — ENDO CARRY-ON PROCEDURE KIT INCLUDES LUBRICANT, DEFENDO OLYMPUS AIR, WATER, SUCTION, BIOPSY VALVE KIT, ENZYMATIC SPONGE, AND BASIN.: Brand: ENDO CARRY-ON PROCEDURE KIT

## (undated) DEVICE — SINGLE PORT MANIFOLD: Brand: NEPTUNE 2

## (undated) DEVICE — JELLY,LUBE,STERILE,FLIP TOP,TUBE,2-OZ: Brand: MEDLINE

## (undated) DEVICE — TUBING IRRIGATION 140/160/180/190 SER GI ENDOSCP SMARTCAP

## (undated) DEVICE — ADAPTER FLSH PMP FLD MGMT GI IRRIG OFP 2 DISPOSABLE

## (undated) DEVICE — BRUSH ENDO CLN L90.5IN SHTH DIA1.7MM BRIST DIA5-7MM 2-6MM

## (undated) DEVICE — GAUZE,SPONGE,4"X4",4PLY,STRL,LF: Brand: MEDLINE

## (undated) DEVICE — TUBE SET 96 MM 64 MM H2O PERISTALTIC STD AUX CHANNEL

## (undated) DEVICE — GLOVE ORANGE PI 7 1/2   MSG9075